# Patient Record
Sex: FEMALE | Race: WHITE | Employment: UNEMPLOYED | URBAN - METROPOLITAN AREA
[De-identification: names, ages, dates, MRNs, and addresses within clinical notes are randomized per-mention and may not be internally consistent; named-entity substitution may affect disease eponyms.]

---

## 2017-01-12 PROBLEM — Z36.89 SCREENING, ANTENATAL, FOR FETAL ANATOMIC SURVEY: Status: ACTIVE | Noted: 2017-01-12

## 2017-01-12 PROBLEM — O34.219 PREVIOUS CESAREAN DELIVERY AFFECTING PREGNANCY, ANTEPARTUM: Status: ACTIVE | Noted: 2017-01-12

## 2017-01-12 PROBLEM — Z3A.21 21 WEEKS GESTATION OF PREGNANCY: Status: ACTIVE | Noted: 2017-01-12

## 2018-04-26 ENCOUNTER — HOSPITAL ENCOUNTER (EMERGENCY)
Age: 28
Discharge: HOME | End: 2018-04-26
Attending: EMERGENCY MEDICINE
Payer: COMMERCIAL

## 2018-04-26 VITALS
HEIGHT: 61 IN | TEMPERATURE: 97.8 F | RESPIRATION RATE: 16 BRPM | SYSTOLIC BLOOD PRESSURE: 109 MMHG | DIASTOLIC BLOOD PRESSURE: 61 MMHG | BODY MASS INDEX: 20.01 KG/M2 | HEART RATE: 91 BPM | WEIGHT: 106 LBS | OXYGEN SATURATION: 100 %

## 2018-04-26 DIAGNOSIS — N89.8 VAGINAL DISCHARGE: Primary | ICD-10-CM

## 2018-04-26 LAB
BACTERIA: ABNORMAL /HPF
BILIRUBIN URINE: NEGATIVE
BLOOD, URINE: NEGATIVE
CLARITY: NORMAL
CLUE CELLS: NORMAL
COLOR: YELLOW
GLUCOSE URINE: NEGATIVE MG/DL
KETONES, URINE: NEGATIVE MG/DL
LEUKOCYTE ESTERASE, URINE: NEGATIVE
NITRITE, URINE: NEGATIVE
PH UA: 7 (ref 5–9)
PROTEIN UA: NEGATIVE MG/DL
RBC UA: ABNORMAL /HPF (ref 0–2)
SOURCE WET PREP: NORMAL
SPECIFIC GRAVITY UA: 1.02 (ref 1–1.03)
TRICHOMONAS PREP: NORMAL
UROBILINOGEN, URINE: 1 E.U./DL
WBC UA: ABNORMAL /HPF (ref 0–5)
YEAST WET PREP: NORMAL

## 2018-04-26 PROCEDURE — 87591 N.GONORRHOEAE DNA AMP PROB: CPT

## 2018-04-26 PROCEDURE — 81001 URINALYSIS AUTO W/SCOPE: CPT

## 2018-04-26 PROCEDURE — 87140 CULTURE TYPE IMMUNOFLUORESC: CPT

## 2018-04-26 PROCEDURE — 87255 GENET VIRUS ISOLATE HSV: CPT

## 2018-04-26 PROCEDURE — 87210 SMEAR WET MOUNT SALINE/INK: CPT

## 2018-04-26 PROCEDURE — 87088 URINE BACTERIA CULTURE: CPT

## 2018-04-26 PROCEDURE — 87077 CULTURE AEROBIC IDENTIFY: CPT

## 2018-04-26 PROCEDURE — 87186 SC STD MICRODIL/AGAR DIL: CPT

## 2018-04-26 PROCEDURE — 99283 EMERGENCY DEPT VISIT LOW MDM: CPT

## 2018-04-26 PROCEDURE — 87491 CHLMYD TRACH DNA AMP PROBE: CPT

## 2018-04-26 ASSESSMENT — PAIN DESCRIPTION - LOCATION: LOCATION: VAGINA

## 2018-04-26 ASSESSMENT — PAIN DESCRIPTION - DESCRIPTORS: DESCRIPTORS: ITCHING

## 2018-04-26 ASSESSMENT — PAIN SCALES - GENERAL: PAINLEVEL_OUTOF10: 9

## 2018-04-28 LAB
ORGANISM: ABNORMAL
URINE CULTURE, ROUTINE: ABNORMAL
URINE CULTURE, ROUTINE: ABNORMAL

## 2018-04-29 LAB — ORGANISM: ABNORMAL

## 2018-05-02 LAB
CHLAMYDIA TRACHOMATIS AMPLIFIED DET: NORMAL
N GONORRHOEAE AMPLIFIED DET: NORMAL

## 2019-01-27 ENCOUNTER — HOSPITAL ENCOUNTER (EMERGENCY)
Age: 29
Discharge: HOME | End: 2019-01-27
Attending: EMERGENCY MEDICINE
Payer: COMMERCIAL

## 2019-01-27 VITALS
HEIGHT: 61 IN | WEIGHT: 100 LBS | HEART RATE: 67 BPM | OXYGEN SATURATION: 100 % | TEMPERATURE: 97.7 F | RESPIRATION RATE: 16 BRPM | BODY MASS INDEX: 18.88 KG/M2 | SYSTOLIC BLOOD PRESSURE: 121 MMHG | DIASTOLIC BLOOD PRESSURE: 87 MMHG

## 2019-01-27 DIAGNOSIS — F19.10 DRUG ABUSE (HCC): Primary | ICD-10-CM

## 2019-01-27 DIAGNOSIS — G47.00 INSOMNIA, UNSPECIFIED TYPE: ICD-10-CM

## 2019-01-27 PROCEDURE — 99284 EMERGENCY DEPT VISIT MOD MDM: CPT

## 2019-01-27 RX ORDER — DIPHENHYDRAMINE HCL 25 MG
25 TABLET ORAL NIGHTLY PRN
Qty: 5 TABLET | Refills: 0 | Status: SHIPPED | OUTPATIENT
Start: 2019-01-27 | End: 2019-02-26

## 2021-03-23 ENCOUNTER — NURSE ONLY (OUTPATIENT)
Dept: PRIMARY CARE CLINIC | Age: 31
End: 2021-03-23
Payer: COMMERCIAL

## 2021-03-23 DIAGNOSIS — Z11.52 ENCOUNTER FOR SCREENING FOR COVID-19: Primary | ICD-10-CM

## 2021-03-23 LAB
Lab: NORMAL
PERFORMING INSTRUMENT: NORMAL
QC PASS/FAIL: NORMAL
SARS-COV-2, POC: NORMAL

## 2021-03-23 PROCEDURE — 87426 SARSCOV CORONAVIRUS AG IA: CPT | Performed by: NURSE PRACTITIONER

## 2021-03-28 ENCOUNTER — ANESTHESIA EVENT (OUTPATIENT)
Dept: LABOR AND DELIVERY | Age: 31
DRG: 540 | End: 2021-03-28
Payer: COMMERCIAL

## 2021-03-29 ENCOUNTER — ANESTHESIA (OUTPATIENT)
Dept: LABOR AND DELIVERY | Age: 31
DRG: 540 | End: 2021-03-29
Payer: COMMERCIAL

## 2021-03-29 ENCOUNTER — HOSPITAL ENCOUNTER (INPATIENT)
Age: 31
LOS: 2 days | Discharge: HOME | DRG: 540 | End: 2021-03-31
Attending: OBSTETRICS & GYNECOLOGY | Admitting: OBSTETRICS & GYNECOLOGY
Payer: COMMERCIAL

## 2021-03-29 VITALS — SYSTOLIC BLOOD PRESSURE: 124 MMHG | DIASTOLIC BLOOD PRESSURE: 58 MMHG | OXYGEN SATURATION: 91 %

## 2021-03-29 PROBLEM — Z3A.40 40 WEEKS GESTATION OF PREGNANCY: Status: ACTIVE | Noted: 2021-03-29

## 2021-03-29 LAB
ABO/RH: NORMAL
AMPHETAMINE SCREEN, URINE: NOT DETECTED
ANTIBODY SCREEN: NORMAL
BARBITURATE SCREEN URINE: NOT DETECTED
BENZODIAZEPINE SCREEN, URINE: NOT DETECTED
CANNABINOID SCREEN URINE: NOT DETECTED
COCAINE METABOLITE SCREEN URINE: POSITIVE
FENTANYL SCREEN, URINE: NOT DETECTED
HCT VFR BLD CALC: 34.7 % (ref 34–48)
HEMOGLOBIN: 11.7 G/DL (ref 11.5–15.5)
Lab: ABNORMAL
MCH RBC QN AUTO: 31.5 PG (ref 26–35)
MCHC RBC AUTO-ENTMCNC: 33.7 % (ref 32–34.5)
MCV RBC AUTO: 93.3 FL (ref 80–99.9)
METHADONE SCREEN, URINE: NOT DETECTED
OPIATE SCREEN URINE: NOT DETECTED
OXYCODONE URINE: NOT DETECTED
PDW BLD-RTO: 14.3 FL (ref 11.5–15)
PHENCYCLIDINE SCREEN URINE: NOT DETECTED
PLATELET # BLD: 246 E9/L (ref 130–450)
PMV BLD AUTO: 10.4 FL (ref 7–12)
RBC # BLD: 3.72 E12/L (ref 3.5–5.5)
WBC # BLD: 8 E9/L (ref 4.5–11.5)

## 2021-03-29 PROCEDURE — 6370000000 HC RX 637 (ALT 250 FOR IP): Performed by: OBSTETRICS & GYNECOLOGY

## 2021-03-29 PROCEDURE — 3609079900 HC CESAREAN SECTION: Performed by: OBSTETRICS & GYNECOLOGY

## 2021-03-29 PROCEDURE — 6370000000 HC RX 637 (ALT 250 FOR IP): Performed by: ANESTHESIOLOGY

## 2021-03-29 PROCEDURE — 88307 TISSUE EXAM BY PATHOLOGIST: CPT

## 2021-03-29 PROCEDURE — 85027 COMPLETE CBC AUTOMATED: CPT

## 2021-03-29 PROCEDURE — 59514 CESAREAN DELIVERY ONLY: CPT | Performed by: OBSTETRICS & GYNECOLOGY

## 2021-03-29 PROCEDURE — 1220000000 HC SEMI PRIVATE OB R&B

## 2021-03-29 PROCEDURE — 2580000003 HC RX 258: Performed by: OBSTETRICS & GYNECOLOGY

## 2021-03-29 PROCEDURE — 2709999900 HC NON-CHARGEABLE SUPPLY: Performed by: OBSTETRICS & GYNECOLOGY

## 2021-03-29 PROCEDURE — 6360000002 HC RX W HCPCS: Performed by: OBSTETRICS & GYNECOLOGY

## 2021-03-29 PROCEDURE — 80307 DRUG TEST PRSMV CHEM ANLYZR: CPT

## 2021-03-29 PROCEDURE — 7100000000 HC PACU RECOVERY - FIRST 15 MIN: Performed by: OBSTETRICS & GYNECOLOGY

## 2021-03-29 PROCEDURE — 86900 BLOOD TYPING SEROLOGIC ABO: CPT

## 2021-03-29 PROCEDURE — G0480 DRUG TEST DEF 1-7 CLASSES: HCPCS

## 2021-03-29 PROCEDURE — 3700000001 HC ADD 15 MINUTES (ANESTHESIA): Performed by: OBSTETRICS & GYNECOLOGY

## 2021-03-29 PROCEDURE — 86850 RBC ANTIBODY SCREEN: CPT

## 2021-03-29 PROCEDURE — 2780000010 HC IMPLANT OTHER: Performed by: OBSTETRICS & GYNECOLOGY

## 2021-03-29 PROCEDURE — 2500000003 HC RX 250 WO HCPCS

## 2021-03-29 PROCEDURE — 6360000002 HC RX W HCPCS

## 2021-03-29 PROCEDURE — 6360000002 HC RX W HCPCS: Performed by: ANESTHESIOLOGY

## 2021-03-29 PROCEDURE — 86901 BLOOD TYPING SEROLOGIC RH(D): CPT

## 2021-03-29 PROCEDURE — 7100000001 HC PACU RECOVERY - ADDTL 15 MIN: Performed by: OBSTETRICS & GYNECOLOGY

## 2021-03-29 PROCEDURE — 36415 COLL VENOUS BLD VENIPUNCTURE: CPT

## 2021-03-29 PROCEDURE — 3700000000 HC ANESTHESIA ATTENDED CARE: Performed by: OBSTETRICS & GYNECOLOGY

## 2021-03-29 RX ORDER — MODIFIED LANOLIN
OINTMENT (GRAM) TOPICAL
Status: DISCONTINUED | OUTPATIENT
Start: 2021-03-29 | End: 2021-03-31 | Stop reason: HOSPADM

## 2021-03-29 RX ORDER — PHENYLEPHRINE HYDROCHLORIDE 10 MG/ML
INJECTION INTRAVENOUS PRN
Status: DISCONTINUED | OUTPATIENT
Start: 2021-03-29 | End: 2021-03-29 | Stop reason: SDUPTHER

## 2021-03-29 RX ORDER — KETOROLAC TROMETHAMINE 30 MG/ML
INJECTION, SOLUTION INTRAMUSCULAR; INTRAVENOUS
Status: COMPLETED
Start: 2021-03-29 | End: 2021-03-29

## 2021-03-29 RX ORDER — OXYCODONE HYDROCHLORIDE AND ACETAMINOPHEN 5; 325 MG/1; MG/1
1 TABLET ORAL EVERY 6 HOURS PRN
Status: DISPENSED | OUTPATIENT
Start: 2021-03-29 | End: 2021-03-30

## 2021-03-29 RX ORDER — KETOROLAC TROMETHAMINE 30 MG/ML
30 INJECTION, SOLUTION INTRAMUSCULAR; INTRAVENOUS EVERY 6 HOURS
Status: ACTIVE | OUTPATIENT
Start: 2021-03-30 | End: 2021-03-31

## 2021-03-29 RX ORDER — BUPIVACAINE HYDROCHLORIDE 7.5 MG/ML
INJECTION, SOLUTION INTRASPINAL PRN
Status: DISCONTINUED | OUTPATIENT
Start: 2021-03-29 | End: 2021-03-29 | Stop reason: SDUPTHER

## 2021-03-29 RX ORDER — TRISODIUM CITRATE DIHYDRATE AND CITRIC ACID MONOHYDRATE 500; 334 MG/5ML; MG/5ML
30 SOLUTION ORAL ONCE
Status: COMPLETED | OUTPATIENT
Start: 2021-03-29 | End: 2021-03-29

## 2021-03-29 RX ORDER — BUPRENORPHINE HYDROCHLORIDE 8 MG/1
8 TABLET SUBLINGUAL 2 TIMES DAILY
Status: DISCONTINUED | OUTPATIENT
Start: 2021-03-29 | End: 2021-03-29 | Stop reason: SDUPTHER

## 2021-03-29 RX ORDER — KETOROLAC TROMETHAMINE 30 MG/ML
15 INJECTION, SOLUTION INTRAMUSCULAR; INTRAVENOUS EVERY 6 HOURS PRN
Status: DISPENSED | OUTPATIENT
Start: 2021-03-29 | End: 2021-03-30

## 2021-03-29 RX ORDER — CARBOPROST TROMETHAMINE 250 UG/ML
250 INJECTION, SOLUTION INTRAMUSCULAR PRN
Status: DISCONTINUED | OUTPATIENT
Start: 2021-03-29 | End: 2021-03-31 | Stop reason: HOSPADM

## 2021-03-29 RX ORDER — MISOPROSTOL 200 UG/1
800 TABLET ORAL PRN
Status: DISCONTINUED | OUTPATIENT
Start: 2021-03-29 | End: 2021-03-31 | Stop reason: HOSPADM

## 2021-03-29 RX ORDER — NALOXONE HYDROCHLORIDE 0.4 MG/ML
0.4 INJECTION, SOLUTION INTRAMUSCULAR; INTRAVENOUS; SUBCUTANEOUS PRN
Status: DISCONTINUED | OUTPATIENT
Start: 2021-03-29 | End: 2021-03-31 | Stop reason: HOSPADM

## 2021-03-29 RX ORDER — ONDANSETRON 2 MG/ML
INJECTION INTRAMUSCULAR; INTRAVENOUS PRN
Status: DISCONTINUED | OUTPATIENT
Start: 2021-03-29 | End: 2021-03-29 | Stop reason: SDUPTHER

## 2021-03-29 RX ORDER — DOCUSATE SODIUM 100 MG/1
100 CAPSULE, LIQUID FILLED ORAL 2 TIMES DAILY
Status: DISCONTINUED | OUTPATIENT
Start: 2021-03-29 | End: 2021-03-31 | Stop reason: HOSPADM

## 2021-03-29 RX ORDER — DIPHENHYDRAMINE HYDROCHLORIDE 50 MG/ML
12.5 INJECTION INTRAMUSCULAR; INTRAVENOUS EVERY 6 HOURS PRN
Status: ACTIVE | OUTPATIENT
Start: 2021-03-29 | End: 2021-03-30

## 2021-03-29 RX ORDER — SODIUM CHLORIDE, SODIUM LACTATE, POTASSIUM CHLORIDE, CALCIUM CHLORIDE 600; 310; 30; 20 MG/100ML; MG/100ML; MG/100ML; MG/100ML
INJECTION, SOLUTION INTRAVENOUS CONTINUOUS
Status: DISCONTINUED | OUTPATIENT
Start: 2021-03-29 | End: 2021-03-31 | Stop reason: HOSPADM

## 2021-03-29 RX ORDER — ACETAMINOPHEN 325 MG/1
650 TABLET ORAL EVERY 4 HOURS PRN
Status: DISCONTINUED | OUTPATIENT
Start: 2021-03-30 | End: 2021-03-31 | Stop reason: HOSPADM

## 2021-03-29 RX ORDER — IBUPROFEN 800 MG/1
800 TABLET ORAL EVERY 8 HOURS
Status: DISCONTINUED | OUTPATIENT
Start: 2021-03-30 | End: 2021-03-31 | Stop reason: HOSPADM

## 2021-03-29 RX ORDER — METHYLERGONOVINE MALEATE 0.2 MG/ML
200 INJECTION INTRAVENOUS PRN
Status: DISCONTINUED | OUTPATIENT
Start: 2021-03-29 | End: 2021-03-31 | Stop reason: HOSPADM

## 2021-03-29 RX ORDER — SODIUM CHLORIDE, SODIUM LACTATE, POTASSIUM CHLORIDE, AND CALCIUM CHLORIDE .6; .31; .03; .02 G/100ML; G/100ML; G/100ML; G/100ML
1000 INJECTION, SOLUTION INTRAVENOUS ONCE
Status: COMPLETED | OUTPATIENT
Start: 2021-03-29 | End: 2021-03-29

## 2021-03-29 RX ORDER — KETAMINE HYDROCHLORIDE 10 MG/ML
INJECTION, SOLUTION INTRAMUSCULAR; INTRAVENOUS PRN
Status: DISCONTINUED | OUTPATIENT
Start: 2021-03-29 | End: 2021-03-29 | Stop reason: SDUPTHER

## 2021-03-29 RX ORDER — PRENATAL WITH FERROUS FUM AND FOLIC ACID 3080; 920; 120; 400; 22; 1.84; 3; 20; 10; 1; 12; 200; 27; 25; 2 [IU]/1; [IU]/1; MG/1; [IU]/1; MG/1; MG/1; MG/1; MG/1; MG/1; MG/1; UG/1; MG/1; MG/1; MG/1; MG/1
1 TABLET ORAL DAILY
Status: DISCONTINUED | OUTPATIENT
Start: 2021-03-29 | End: 2021-03-31 | Stop reason: HOSPADM

## 2021-03-29 RX ORDER — BUPRENORPHINE HYDROCHLORIDE 8 MG/1
8 TABLET SUBLINGUAL 2 TIMES DAILY
Status: DISCONTINUED | OUTPATIENT
Start: 2021-03-29 | End: 2021-03-30

## 2021-03-29 RX ORDER — ONDANSETRON 2 MG/ML
4 INJECTION INTRAMUSCULAR; INTRAVENOUS EVERY 6 HOURS PRN
Status: ACTIVE | OUTPATIENT
Start: 2021-03-29 | End: 2021-03-30

## 2021-03-29 RX ORDER — SODIUM CHLORIDE 0.9 % (FLUSH) 0.9 %
SYRINGE (ML) INJECTION
Status: DISPENSED
Start: 2021-03-29 | End: 2021-03-30

## 2021-03-29 RX ORDER — FENTANYL CITRATE 50 UG/ML
25 INJECTION, SOLUTION INTRAMUSCULAR; INTRAVENOUS EVERY 5 MIN PRN
Status: DISCONTINUED | OUTPATIENT
Start: 2021-03-29 | End: 2021-03-29 | Stop reason: HOSPADM

## 2021-03-29 RX ORDER — FERROUS SULFATE 325(65) MG
325 TABLET ORAL 2 TIMES DAILY WITH MEALS
Status: DISCONTINUED | OUTPATIENT
Start: 2021-03-29 | End: 2021-03-31 | Stop reason: HOSPADM

## 2021-03-29 RX ORDER — SODIUM CHLORIDE 0.9 % (FLUSH) 0.9 %
10 SYRINGE (ML) INJECTION EVERY 12 HOURS SCHEDULED
Status: DISCONTINUED | OUTPATIENT
Start: 2021-03-29 | End: 2021-03-31 | Stop reason: HOSPADM

## 2021-03-29 RX ORDER — MORPHINE SULFATE 1 MG/ML
INJECTION, SOLUTION EPIDURAL; INTRATHECAL; INTRAVENOUS PRN
Status: DISCONTINUED | OUTPATIENT
Start: 2021-03-29 | End: 2021-03-29 | Stop reason: SDUPTHER

## 2021-03-29 RX ORDER — DIPHENHYDRAMINE HYDROCHLORIDE 50 MG/ML
25 INJECTION INTRAMUSCULAR; INTRAVENOUS EVERY 6 HOURS PRN
Status: DISCONTINUED | OUTPATIENT
Start: 2021-03-30 | End: 2021-03-31 | Stop reason: HOSPADM

## 2021-03-29 RX ORDER — LIDOCAINE HYDROCHLORIDE 10 MG/ML
INJECTION, SOLUTION INFILTRATION; PERINEURAL PRN
Status: DISCONTINUED | OUTPATIENT
Start: 2021-03-29 | End: 2021-03-29 | Stop reason: SDUPTHER

## 2021-03-29 RX ORDER — METHYLERGONOVINE MALEATE 0.2 MG/ML
200 INJECTION INTRAVENOUS ONCE
Status: COMPLETED | OUTPATIENT
Start: 2021-03-29 | End: 2021-03-29

## 2021-03-29 RX ORDER — OXYCODONE HYDROCHLORIDE AND ACETAMINOPHEN 5; 325 MG/1; MG/1
2 TABLET ORAL EVERY 6 HOURS PRN
Status: DISPENSED | OUTPATIENT
Start: 2021-03-29 | End: 2021-03-30

## 2021-03-29 RX ORDER — MIDAZOLAM HYDROCHLORIDE 1 MG/ML
INJECTION INTRAMUSCULAR; INTRAVENOUS PRN
Status: DISCONTINUED | OUTPATIENT
Start: 2021-03-29 | End: 2021-03-29 | Stop reason: SDUPTHER

## 2021-03-29 RX ORDER — FENTANYL CITRATE 50 UG/ML
INJECTION, SOLUTION INTRAMUSCULAR; INTRAVENOUS PRN
Status: DISCONTINUED | OUTPATIENT
Start: 2021-03-29 | End: 2021-03-29 | Stop reason: SDUPTHER

## 2021-03-29 RX ADMIN — PHENYLEPHRINE HYDROCHLORIDE 100 MCG: 10 INJECTION INTRAVENOUS at 10:26

## 2021-03-29 RX ADMIN — SODIUM CHLORIDE, POTASSIUM CHLORIDE, SODIUM LACTATE AND CALCIUM CHLORIDE: 600; 310; 30; 20 INJECTION, SOLUTION INTRAVENOUS at 10:01

## 2021-03-29 RX ADMIN — KETOROLAC TROMETHAMINE 15 MG: 30 INJECTION, SOLUTION INTRAMUSCULAR; INTRAVENOUS at 11:28

## 2021-03-29 RX ADMIN — SODIUM CHLORIDE, POTASSIUM CHLORIDE, SODIUM LACTATE AND CALCIUM CHLORIDE: 600; 310; 30; 20 INJECTION, SOLUTION INTRAVENOUS at 08:55

## 2021-03-29 RX ADMIN — OXYCODONE HYDROCHLORIDE AND ACETAMINOPHEN 2 TABLET: 5; 325 TABLET ORAL at 21:13

## 2021-03-29 RX ADMIN — SODIUM CITRATE AND CITRIC ACID MONOHYDRATE 30 ML: 500; 334 SOLUTION ORAL at 09:58

## 2021-03-29 RX ADMIN — PHENYLEPHRINE HYDROCHLORIDE 100 MCG: 10 INJECTION INTRAVENOUS at 10:36

## 2021-03-29 RX ADMIN — BUPIVACAINE HYDROCHLORIDE IN DEXTROSE 1.6 ML: 7.5 INJECTION, SOLUTION SUBARACHNOID at 10:19

## 2021-03-29 RX ADMIN — FENTANYL CITRATE 50 MCG: 50 INJECTION, SOLUTION INTRAMUSCULAR; INTRAVENOUS at 10:40

## 2021-03-29 RX ADMIN — Medication 999 ML/HR: at 10:38

## 2021-03-29 RX ADMIN — SODIUM CHLORIDE, POTASSIUM CHLORIDE, SODIUM LACTATE AND CALCIUM CHLORIDE 1000 ML: 600; 310; 30; 20 INJECTION, SOLUTION INTRAVENOUS at 07:55

## 2021-03-29 RX ADMIN — FENTANYL CITRATE 50 MCG: 50 INJECTION, SOLUTION INTRAMUSCULAR; INTRAVENOUS at 11:05

## 2021-03-29 RX ADMIN — ONDANSETRON 4 MG: 2 INJECTION INTRAMUSCULAR; INTRAVENOUS at 10:38

## 2021-03-29 RX ADMIN — SODIUM CHLORIDE, POTASSIUM CHLORIDE, SODIUM LACTATE AND CALCIUM CHLORIDE: 600; 310; 30; 20 INJECTION, SOLUTION INTRAVENOUS at 10:41

## 2021-03-29 RX ADMIN — METHYLERGONOVINE MALEATE 200 MCG: 0.2 INJECTION INTRAVENOUS at 11:13

## 2021-03-29 RX ADMIN — MIDAZOLAM 2 MG: 1 INJECTION INTRAMUSCULAR; INTRAVENOUS at 10:45

## 2021-03-29 RX ADMIN — DOCUSATE SODIUM 100 MG: 100 CAPSULE, LIQUID FILLED ORAL at 20:23

## 2021-03-29 RX ADMIN — LIDOCAINE HYDROCHLORIDE 2 ML: 10 INJECTION, SOLUTION INFILTRATION; PERINEURAL at 10:09

## 2021-03-29 RX ADMIN — KETOROLAC TROMETHAMINE 15 MG: 30 INJECTION, SOLUTION INTRAMUSCULAR; INTRAVENOUS at 18:46

## 2021-03-29 RX ADMIN — SODIUM CHLORIDE, PRESERVATIVE FREE 10 ML: 5 INJECTION INTRAVENOUS at 20:24

## 2021-03-29 RX ADMIN — Medication 2000 MG: at 09:58

## 2021-03-29 RX ADMIN — LIDOCAINE HYDROCHLORIDE 2 ML: 10 INJECTION, SOLUTION INFILTRATION; PERINEURAL at 10:16

## 2021-03-29 RX ADMIN — MORPHINE SULFATE 0.15 MG: 1 INJECTION, SOLUTION EPIDURAL; INTRATHECAL; INTRAVENOUS at 10:19

## 2021-03-29 RX ADMIN — KETAMINE HYDROCHLORIDE 20 MG: 10 INJECTION INTRAMUSCULAR; INTRAVENOUS at 10:45

## 2021-03-29 RX ADMIN — BUPRENORPHINE HCL 8 MG: 8 TABLET SUBLINGUAL at 18:45

## 2021-03-29 RX ADMIN — PHENYLEPHRINE HYDROCHLORIDE 100 MCG: 10 INJECTION INTRAVENOUS at 10:31

## 2021-03-29 RX ADMIN — OXYCODONE HYDROCHLORIDE AND ACETAMINOPHEN 1 TABLET: 5; 325 TABLET ORAL at 14:45

## 2021-03-29 ASSESSMENT — PULMONARY FUNCTION TESTS
PIF_VALUE: 0
PIF_VALUE: 1
PIF_VALUE: 0
PIF_VALUE: 1
PIF_VALUE: 0
PIF_VALUE: 1
PIF_VALUE: 1
PIF_VALUE: 0
PIF_VALUE: 0
PIF_VALUE: 1
PIF_VALUE: 0
PIF_VALUE: 1
PIF_VALUE: 1
PIF_VALUE: 0
PIF_VALUE: 1
PIF_VALUE: 1
PIF_VALUE: 0
PIF_VALUE: 0
PIF_VALUE: 1
PIF_VALUE: 0
PIF_VALUE: 0
PIF_VALUE: 1
PIF_VALUE: 1
PIF_VALUE: 0
PIF_VALUE: 1
PIF_VALUE: 0
PIF_VALUE: 1

## 2021-03-29 ASSESSMENT — PAIN DESCRIPTION - PROGRESSION
CLINICAL_PROGRESSION: GRADUALLY IMPROVING
CLINICAL_PROGRESSION: GRADUALLY WORSENING

## 2021-03-29 ASSESSMENT — PAIN SCALES - GENERAL
PAINLEVEL_OUTOF10: 10
PAINLEVEL_OUTOF10: 8
PAINLEVEL_OUTOF10: 10
PAINLEVEL_OUTOF10: 0
PAINLEVEL_OUTOF10: 8
PAINLEVEL_OUTOF10: 4

## 2021-03-29 ASSESSMENT — PAIN DESCRIPTION - ONSET: ONSET: GRADUAL

## 2021-03-29 ASSESSMENT — PAIN DESCRIPTION - DESCRIPTORS
DESCRIPTORS: ACHING;CRAMPING
DESCRIPTORS: CRAMPING;ACHING

## 2021-03-29 ASSESSMENT — PAIN DESCRIPTION - PAIN TYPE
TYPE: SURGICAL PAIN
TYPE: SURGICAL PAIN

## 2021-03-29 ASSESSMENT — PAIN - FUNCTIONAL ASSESSMENT: PAIN_FUNCTIONAL_ASSESSMENT: ACTIVITIES ARE NOT PREVENTED

## 2021-03-29 NOTE — FLOWSHEET NOTE
Patient transported to mom baby unit room #320 via cart accompanied by father of infant, \"Alberto\".

## 2021-03-29 NOTE — H&P
Department of Obstetrics and Gynecology  Attending Obstetrics History and Physical      CHIEF COMPLAINT: For repeat     HISTORY OF PRESENT ILLNESS:      The patient is a 31 y.o. Denisecharlie Dunnman at 40 weeks 1 days  Patient presents with a chief complaint as above and is being admitted for   without tubal ligation    DATES:    Patient's last menstrual period was 2020. Estimated Date of Delivery: 3/28/21    PRENATAL CARE:    Provider: KANIKA Blackwood,  FACOG    Blood Type/Rh: B+  Hepatitis B Surface Antigen: Negative  Gonorrhea: Negative  Chlamydia: Negative  Group B Strep: Negative      PAST OB HISTORY        Depression:  Yes       Post-partum depression:  No      Diabetes:  No      Gestational Diabetes:  No      Thyroid Disease:  No      Chronic HTN:  No      Gestation HTN:  No      Pre-eclampsia:  No      Seizure disorder:  No      Asthma:  No      Clotting disorder:  No      :  Yes x2      Tubal ligation:  No      D & C:  No      Cerclage:  No      LEEP:  No      Myomectomy:  No    OB History    Para Term  AB Living   3 2 2     2   SAB TAB Ectopic Molar Multiple Live Births             2      # Outcome Date GA Lbr Julian/2nd Weight Sex Delivery Anes PTL Lv   3 Current            2 Term 17   8 lb (3.629 kg) M CS-LTranv Spinal N JLUIS   1 Term 14 40w0d  7 lb (3.175 kg) M CS-LTranv  N JLUIS           Past Medical History:    History reviewed. No pertinent past medical history. Past Surgical History:        Procedure Laterality Date     SECTION      INDUCED       MOUTH SURGERY       Social History:    DRUGS:  Current drug usage. Type of drug:  Cocaine and opioids. Frequency of use:  Weekly. Approximate date of last drug use: Several days ago. Duration of drug use:  3 years  Family History:   History reviewed. No pertinent family history.   Medications Prior to Admission:  Medications Prior to Admission: Buprenorphine HCl-Naloxone HCl (SUBOXONE

## 2021-03-29 NOTE — ANESTHESIA PRE PROCEDURE
Department of Anesthesiology  Preprocedure Note       Name:  Bar Huff   Age:  31 y.o.  :  1990                                          MRN:  [id number]         Date:  3/29/2021      Surgeon: Oriana العلي):  Anya Jc MD    Procedure: Procedure(s):   SECTION    Medications prior to admission:   Prior to Admission medications    Medication Sig Start Date End Date Taking? Authorizing Provider   Buprenorphine HCl-Naloxone HCl (SUBOXONE SL) Place under the tongue    Historical Provider, MD   Prenatal Vit-Fe Fumarate-FA (PRENATAL PLUS) 27-1 MG TABS Take 1 tablet by mouth daily 20   DELANEY Mcclain - CNP       Current medications:    Current Facility-Administered Medications   Medication Dose Route Frequency Provider Last Rate Last Admin    lactated ringers infusion   Intravenous Continuous Anya Jc MD        lactated ringers bolus  1,000 mL Intravenous Once Anya Jc MD        citric acid-sodium citrate (BICITRA) solution 30 mL  30 mL Oral Once Anya Jc MD        ceFAZolin (ANCEF) 2000 mg in sterile water 20 mL IV syringe  2 g Intravenous Once Anya Jc MD        oxytocin (PITOCIN) 10 unit bolus from the bag  10 Units Intravenous PRN Anya Jc MD        And    oxytocin (PITOCIN) 30 units in 500 mL infusion  87.3 samantha-units/min Intravenous PRN Anya Jc MD           Allergies:  No Known Allergies    Problem List:    Patient Active Problem List   Diagnosis Code    21 weeks gestation of pregnancy Z3A.21    Screening, , for fetal anatomic survey Z36.89    Previous  delivery affecting pregnancy, antepartum O34.219    40 weeks gestation of pregnancy Z3A.40       Past Medical History:  No past medical history on file.     Past Surgical History:        Procedure Laterality Date     SECTION      INDUCED       MOUTH SURGERY         Social History:    Social History     Tobacco Use    Smoking status: Current Every Day 03/23/2021           Anesthesia Evaluation  Patient summary reviewed and Nursing notes reviewed no history of anesthetic complications:   Airway: Mallampati: III  TM distance: >3 FB   Neck ROM: full  Mouth opening: > = 3 FB Dental:          Pulmonary:Negative Pulmonary ROS breath sounds clear to auscultation                             Cardiovascular:Negative CV ROS            Rhythm: regular  Rate: normal                    Neuro/Psych:   Negative Neuro/Psych ROS              GI/Hepatic/Renal: Neg GI/Hepatic/Renal ROS            Endo/Other:                      ROS comment: Heroin use in the past--has been on Suboxone recently. Hx of cocaine in tox screen today. Abdominal:       Abdomen: soft. Vascular: negative vascular ROS. Anesthesia Plan      spinal and general     ASA 2     (R HA PIV 20. Pt understands risks and benefits to spinal and general anesthesia. Pt accepts general anesthesia as backup plan only. )  Induction: rapid sequence. Anesthetic plan and risks discussed with patient. Use of blood products discussed with patient whom consented to blood products. Plan discussed with CRNA and attending.                   Anayeli Lewis RN   3/29/2021

## 2021-03-29 NOTE — ANESTHESIA PROCEDURE NOTES
Spinal Block    Patient location during procedure: OB  Start time: 3/29/2021 10:10 AM  End time: 3/29/2021 10:19 AM  Reason for block: procedure for pain, post-op pain management, primary anesthetic and at surgeon's request  Staffing  Performed: anesthesiologist and other anesthesia staff   Anesthesiologist: Ifeoma Howell MD  Resident/CRNA: Heladio Clemens APRN - CRNA  Other anesthesia staff: Flaco Vázquez RN  Preanesthetic Checklist  Completed: patient identified, IV checked, risks and benefits discussed, surgical consent, monitors and equipment checked, pre-op evaluation, timeout performed, anesthesia consent given, oxygen available and patient being monitored  Spinal Block  Patient position: sitting  Prep: ChloraPrep  Patient monitoring: cardiac monitor, continuous pulse ox and frequent blood pressure checks  Approach: midline  Location: L3/L4  Provider prep: mask and sterile gloves  Local infiltration: lidocaine  Dose: 0.2  Agent: bupivacaine  Adjuvant: duramorph  Dose: 1.6  Dose: 1.6  Needle  Needle type: Pencan   Needle gauge: 25 G  Needle length: 3.5 in  Needle insertion depth: 6 cm  Assessment  Events: cerebrospinal fluid  Swirl obtained: Yes  CSF: clear  Attempts: 3+  Hemodynamics: stable

## 2021-03-29 NOTE — FLOWSHEET NOTE
Patient admitted to room 320 from labor and delivery. Oriented to room and surroundings. Reviewed papers at bedside. [facility] Infant safety instructions and sleep safe for baby given, patient verbalizes understanding. Informed of visitation policy and that one person 25 or older may stay. Instructed on IS. IV infusing without difficulty.  Patient states ok for infant to have the hep B vaccine, refuses flu and tdap vaccines

## 2021-03-29 NOTE — PROGRESS NOTES
presents to unit for repeat LTCS. Pt is 40weeks and 1 day. Denies lof,vaginal bleeding, perceives fetal movement and feeling occassional contractions. Pt placed on efm and call light within reach.

## 2021-03-29 NOTE — OP NOTE
Operative Note      Patient: Freda Kocher  YOB: 1990  MRN: [id number]    Date of Procedure: 3/29/2021    Pre-Op Diagnosis: csection    Post-Op Diagnosis: Same       Procedure(s):   SECTION    Surgeon(s):  MD Bina Ferrara MD    Assistant: Bina Koenig    Anesthesia: Spinal    Estimated Blood Loss (mL): 300    Complications: None    Specimens:   * No specimens in log *    Implants:  * No implants in log *      Drains:   Urethral Catheter Non-latex 16 fr (Active)       First Assistant: Bina Koenig MD    The use of a first assistant surgeon was necessary because there was no qualified resident surgeon available. Bina Koenig MD  assisted in the proper positioning, prepping, and draping of the patient, intraoperative retraction and suctioning for visualization, passing sutures and suture management.   Electronically signed by Bina Koenig MD on 3/29/2021 at 1:49 PM

## 2021-03-29 NOTE — PROGRESS NOTES
Repeate LTCS of viable baby girl at 46. Baby pink and warm, delayed cord clamping complete. Baby brought to warmer for further assessment. APGARS 9/9.

## 2021-03-29 NOTE — PROCEDURES
Department of Obstetrics and Gynecology   Section Note    Indications: previous uterine incision carrillo x2    Pre-operative Diagnosis: 40 week 1 day pregnancy. Post-operative Diagnosis: Living  infant(s) and Female    Surgeon: Lashell Bright     Assistants: LEIF Wu. Anesthesia: Spinal anesthesia    ASA Class: 2    Procedure Details   The patient was seen in the Holding Room. The risks, benefits, complications, treatment options, and expected outcomes were discussed with the patient. The patient concurred with the proposed plan, giving informed consent. The site of surgery properly noted/marked. The patient was taken to Operating Room # 2, identified as Jan Murrieta and the procedure verified as  Delivery. A Time Out was held and the above information confirmed. After induction of anesthesia, the patient was draped and prepped in the usual sterile manner. A Pfannenstiel incision was made and carried down through the subcutaneous tissue to the fascia. Fascial incision was made and extended transversely. The fascia was  from the underlying rectus tissue superiorly and inferiorly. The peritoneum was identified and entered. Peritoneal incision was extended longitudinally. The utero-vesical peritoneal reflection was incised transversely and the bladder flap was bluntly freed from the lower uterine segment. A low transverse uterine incision was made. Delivered from ROT presentation was a 3220 gram female with Apgar scores of 9 at one minute and 9 at five minutes. After the umbilical cord was clamped and cut cord blood was obtained for evaluation. The placenta was removed intact and appeared normal. The uterine outline, tubes and ovaries appeared normal. The uterine incision was closed with running locked sutures of 0 Vicryl. Hemostasis was observed. Lavage was carried out until clear. Angélica and Interceed were placed over the uterine incision.   The fascia was then reapproximated with running sutures of 1.0 Vicryl. The skin was reapproximated with absorbable skin staples and Dermabond. Instrument, sponge, and needle counts were correct prior the abdominal closure and at the conclusion of the case. Findings:  The placenta ovaries fallopian tubes were normal    Estimated Blood Loss:  500ml           Drains: Garcia           Total IV Fluids: 2200 ml           Specimens: The placenta           Implants: None           Complications: None            Disposition: PACU - hemodynamically stable. Condition: infant stable to general nursery    Attending Attestation: I performed the procedure.

## 2021-03-30 LAB
HCT VFR BLD CALC: 33.4 % (ref 34–48)
HEMOGLOBIN: 11.1 G/DL (ref 11.5–15.5)
MCH RBC QN AUTO: 31.5 PG (ref 26–35)
MCHC RBC AUTO-ENTMCNC: 33.2 % (ref 32–34.5)
MCV RBC AUTO: 94.9 FL (ref 80–99.9)
PDW BLD-RTO: 14.5 FL (ref 11.5–15)
PLATELET # BLD: 246 E9/L (ref 130–450)
PMV BLD AUTO: 11.3 FL (ref 7–12)
RBC # BLD: 3.52 E12/L (ref 3.5–5.5)
WBC # BLD: 10.7 E9/L (ref 4.5–11.5)

## 2021-03-30 PROCEDURE — 6360000002 HC RX W HCPCS: Performed by: ANESTHESIOLOGY

## 2021-03-30 PROCEDURE — 6360000002 HC RX W HCPCS: Performed by: OBSTETRICS & GYNECOLOGY

## 2021-03-30 PROCEDURE — 36415 COLL VENOUS BLD VENIPUNCTURE: CPT

## 2021-03-30 PROCEDURE — 85027 COMPLETE CBC AUTOMATED: CPT

## 2021-03-30 PROCEDURE — 6370000000 HC RX 637 (ALT 250 FOR IP): Performed by: OBSTETRICS & GYNECOLOGY

## 2021-03-30 PROCEDURE — 6370000000 HC RX 637 (ALT 250 FOR IP): Performed by: ANESTHESIOLOGY

## 2021-03-30 PROCEDURE — 2580000003 HC RX 258: Performed by: OBSTETRICS & GYNECOLOGY

## 2021-03-30 PROCEDURE — 1220000000 HC SEMI PRIVATE OB R&B

## 2021-03-30 RX ORDER — NICOTINE 21 MG/24HR
1 PATCH, TRANSDERMAL 24 HOURS TRANSDERMAL DAILY
Status: DISCONTINUED | OUTPATIENT
Start: 2021-03-30 | End: 2021-03-31 | Stop reason: HOSPADM

## 2021-03-30 RX ORDER — BUPRENORPHINE HYDROCHLORIDE 8 MG/1
8 TABLET SUBLINGUAL ONCE
Status: COMPLETED | OUTPATIENT
Start: 2021-03-30 | End: 2021-03-30

## 2021-03-30 RX ORDER — BUPRENORPHINE HYDROCHLORIDE 8 MG/1
16 TABLET SUBLINGUAL DAILY
Status: DISCONTINUED | OUTPATIENT
Start: 2021-03-31 | End: 2021-03-31 | Stop reason: HOSPADM

## 2021-03-30 RX ADMIN — METFORMIN HYDROCHLORIDE 1 TABLET: 500 TABLET, EXTENDED RELEASE ORAL at 09:34

## 2021-03-30 RX ADMIN — ACETAMINOPHEN 650 MG: 325 TABLET ORAL at 15:20

## 2021-03-30 RX ADMIN — ACETAMINOPHEN 650 MG: 325 TABLET ORAL at 20:41

## 2021-03-30 RX ADMIN — KETOROLAC TROMETHAMINE 15 MG: 30 INJECTION, SOLUTION INTRAMUSCULAR; INTRAVENOUS at 09:32

## 2021-03-30 RX ADMIN — BUPRENORPHINE HCL 8 MG: 8 TABLET SUBLINGUAL at 06:59

## 2021-03-30 RX ADMIN — OXYCODONE HYDROCHLORIDE AND ACETAMINOPHEN 2 TABLET: 5; 325 TABLET ORAL at 03:24

## 2021-03-30 RX ADMIN — BUPRENORPHINE HCL 8 MG: 8 TABLET SUBLINGUAL at 14:01

## 2021-03-30 RX ADMIN — SODIUM CHLORIDE, PRESERVATIVE FREE 10 ML: 5 INJECTION INTRAVENOUS at 09:33

## 2021-03-30 RX ADMIN — KETOROLAC TROMETHAMINE 15 MG: 30 INJECTION, SOLUTION INTRAMUSCULAR; INTRAVENOUS at 02:29

## 2021-03-30 RX ADMIN — IBUPROFEN 800 MG: 800 TABLET, FILM COATED ORAL at 17:40

## 2021-03-30 RX ADMIN — DOCUSATE SODIUM 100 MG: 100 CAPSULE, LIQUID FILLED ORAL at 09:34

## 2021-03-30 RX ADMIN — DOCUSATE SODIUM 100 MG: 100 CAPSULE, LIQUID FILLED ORAL at 20:41

## 2021-03-30 RX ADMIN — OXYCODONE HYDROCHLORIDE AND ACETAMINOPHEN 2 TABLET: 5; 325 TABLET ORAL at 10:13

## 2021-03-30 ASSESSMENT — PAIN DESCRIPTION - ONSET: ONSET: GRADUAL

## 2021-03-30 ASSESSMENT — PAIN - FUNCTIONAL ASSESSMENT: PAIN_FUNCTIONAL_ASSESSMENT: ACTIVITIES ARE NOT PREVENTED

## 2021-03-30 ASSESSMENT — PAIN SCALES - GENERAL
PAINLEVEL_OUTOF10: 8
PAINLEVEL_OUTOF10: 9
PAINLEVEL_OUTOF10: 6
PAINLEVEL_OUTOF10: 6
PAINLEVEL_OUTOF10: 3
PAINLEVEL_OUTOF10: 8

## 2021-03-30 ASSESSMENT — PAIN DESCRIPTION - ORIENTATION: ORIENTATION: LOWER

## 2021-03-30 ASSESSMENT — PAIN DESCRIPTION - FREQUENCY: FREQUENCY: INTERMITTENT

## 2021-03-30 ASSESSMENT — PAIN DESCRIPTION - DESCRIPTORS: DESCRIPTORS: SORE

## 2021-03-30 NOTE — FLOWSHEET NOTE
This note will not be viewable in [facility] for the following reason(s). : Unable to separate mother vs.  PHI Pt and father of the baby have minimal interaction with the infant throughout the night. Father of the infant not making eye contact with staff, slurring words, and stumbling slightly. Infant taken to nursery throughout the night, remains in nursery at this time.

## 2021-03-30 NOTE — PLAN OF CARE
Problem: Pain:  Goal: Pain level will decrease  Description: Pain level will decrease  3/30/2021 0106 by Wang Ward RN  Outcome: Met This Shift  3/29/2021 1828 by Krista Agosto Early  Outcome: Met This Shift  Goal: Control of acute pain  Description: Control of acute pain  Outcome: Met This Shift  Goal: Control of chronic pain  Description: Control of chronic pain  Outcome: Met This Shift     Problem: Fluid Volume - Imbalance:  Goal: Absence of postpartum hemorrhage signs and symptoms  Description: Absence of postpartum hemorrhage signs and symptoms  3/30/2021 0106 by Wang Ward RN  Outcome: Met This Shift  3/29/2021 1828 by Lorrain Curling A Early  Outcome: Met This Shift  Goal: Absence of imbalanced fluid volume signs and symptoms  Description: Absence of imbalanced fluid volume signs and symptoms  3/30/2021 0106 by Wang Ward RN  Outcome: Met This Shift  3/29/2021 1828 by Lorphilipin Karissaling A Early  Outcome: Met This Shift     Problem: Infection - Surgical Site:  Goal: Will show no infection signs and symptoms  Description: Will show no infection signs and symptoms  3/30/2021 0106 by Wang Ward RN  Outcome: Met This Shift  3/29/2021 1828 by Lorrain Curling A Early  Outcome: Met This Shift     Problem: Mood - Altered:  Goal: Mood stable  Description: Mood stable  3/30/2021 0106 by Wang Ward RN  Outcome: Met This Shift  3/29/2021 1828 by Lorphilipin Curling A Early  Outcome: Met This Shift     Problem: Nausea/Vomiting:  Goal: Absence of nausea/vomiting  Description: Absence of nausea/vomiting  Outcome: Met This Shift     Problem: Pain - Acute:  Goal: Pain level will decrease  Description: Pain level will decrease  3/30/2021 0106 by Wang Ward RN  Outcome: Met This Shift  3/29/2021 1828 by Lorrain Curling A Early  Outcome: Met This Shift     Problem: Venous Thromboembolism:  Goal: Will show no signs or symptoms of venous thromboembolism  Description: Will show no signs or symptoms of venous thromboembolism  3/30/2021 0106 by Tonny Cavazos Karl Morris RN  Outcome: Met This Shift  3/29/2021 1828 by Gissel Bueno Early  Outcome: Met This Shift  Goal: Absence of signs or symptoms of impaired coagulation  Description: Absence of signs or symptoms of impaired coagulation  Outcome: Met This Shift

## 2021-03-30 NOTE — CARE COORDINATION
This note will not be viewable in MyChart for the following reason(s). [place]: Unable to separate mother vs.  [address]       Discharge Planning     CHERRIE received a call from baby's father, Daniel Enamorado. Alberto requested an update on children services involvement. CHERRIE informed Alberto that CHERRIE recently spoke with [facility] ( [phone]) supervisor, who reported that a case WILL be assigned, but a  was not assigned at that time. Alberto expressed frustration and reported that he would NOT let [facility] ( [phone]) take his daughter. CHERRIE attempted to provide support and encouraged him to look for family members that may be able to provide support at this time. Alberto questioned this SW on why SW \" had to call children services\" and stated that he did not feel that SW should have called children services. SW provided education and support. Daniel Enamorado did report that if tested today, he would \"test dirty\" but did not say what for. Alberto reported that \" children services can test me next week and I'll be clean. And I'll never make a mistake again\". Danielluz Rubyig did report that mother had tested positive for her last child for cocaine at [facility], and \" the hospital had called her back and said they made a mistake\". CHERRIE informed Alberto, that CHERRIE is not a nurse or doctor, and that CHERRIE would discuss this concern with toxicology tomorrow. Alberto was expressed frustrations with CHERRIE, however was not unpleasant. CHERRIE does have concerns that Alberto may give children services a difficult time as expressed plans to \"fight them\" and refused to \" let them have my daughter\"     CHERRIE received a call from [facility] ( [phone]) , Sabina Davis, who reported she was assigned to the case. Joelle plans on presenting to the hospital to drug test both mother and father tomorrow afternoon and is currently looking for an appropriate family to help and/or place baby with.     PLAN Baby can NOT be discharged home until [facility] ( [phone]) provides disposition  SW to continue communication with nursing staff and [facility] ( [phone])     Electronically signed by [facility] on 3/30/2021 at 3:52 PM

## 2021-03-30 NOTE — FLOWSHEET NOTE
Pt independently ambulated out of bed and to the bathroom and was able to void. Pt has no complaints at this time.

## 2021-03-30 NOTE — CARE COORDINATION
This note will not be viewable in MyChart for the following reason(s). : Unable to separate mother vs.  [address]      SW Discharge Planning   SW received consult for \" pt taking suboxone at home, UDS positive for cocaine use\" SW noted mother with positive UDS for cocaine on 3/29, and baby with positive UDS for cocaine on 3/29/21. CHERRIE was also informed that nursing staff has concerns that father of the baby may be high, reporting that last evening he was stumbling and slurring his words. CHERRIE met with Socorro Martinez ( [phone]) mother to baby girl Taina Ashley ( 3/29/21) and introduced self and role. Hector Nails reported that she resides at the address listed in the chart with baby's boyfriend Aleks Tena \" Shortyreida Lynne" Angelia Mendoza and his mother, Ainsley Portillo. Alberto was present in th room, and Hector Nails requested Alberto to be part of the conversation. Hector Nails appeared to have difficulty waking Alberto up. Hector Nails identified [facility] as a support system, however reported that [facility] is bed/chair ridden and on oxygen. Per Marlen Angella has no history of substance abuse. Hector Nails stated she has two other children, however was unclear if she currently has custody of them Alina Wilkins  3/6/14 and Christi Trevizo 17). Hector Nails stated that she is currently unemployed and plans to add baby to her [employer]. Per Hector Nails, prenatal care was with Dr. Leisa Hidalgo and pediatric care will with [facility]. Hector Nails Reported that she has all needed items including a car seat and pack and play. We discussed safe sleep practices. Hector Nails was agreeable to a [facility] referral. Hector Nails  denied any past or current history of children services involvement, legal issues, domestic violence or mental health diagnosis. We discussed awareness of Post Partum Depression and encouraged contact with her OB if any problems arise. CHERRIE dicussed Hector Nails and baby's positive UDS for cocaine on 3/29/21.  Hector Nails denied any and all usage of any substances aside from [facility] and reported that her THC \" must have been laced\". SW did notify Merlin Grace that she had no positive screens for THC. Merlin Grace stated that this must be a mistake, and reported that this also occurred at [facility], where she receives her subutex from. According to Merlin Sanjay, she also has had positive screens for cocaine through [facility], however insisted that she has only smoked THC. SW also addressed Marianne's past substance abuse history. Per Merlin Grace, she was previously addicted to heroin, and is currently 3 years sober from Heroin. Merlin Sanjay also reported that baby's Father, Marleny Montes is currently 4 years sober from heroin, however told me if he was drug tested now he wouldn't be clean, and declined to tell me what he would test positive for. Alberto did not not say anything in regard to his usage at this time. Both parents expressed understanding for the need of a [facility] ( [phone])  referral.     During assessment, both parents appeared to speak slowly and appeared to have difficulty following the conversation. SW noted a slight slur to both parent's speech. Both had difficulty maintaining eye contact, and Alberto nodded off several times during our conversation. SW has concerns that parents are not forthcoming with information, as no THC was noted in mother's system.      [facility] [phone][facility] referral to Janice Coulter in intake     PLAN  Baby can NOT be discharged home until [facility] ( [phone]) provides disposition  SW to continue communication with nursing staff and [facility] ( [phone])     [facility] referral was completed       Electronically signed by EDWIN Eastman on 3/30/2021 at 10:31 AM

## 2021-03-30 NOTE — PROGRESS NOTES
Spoke with Vera Guerrero from [address] [place] [address] regarding verification of patient's subutex. Stated that patient is ordered 16mg daily and takes it once in the morning.   Spoke with patient, she stated that she takes it around 9am.

## 2021-03-30 NOTE — FLOWSHEET NOTE
Pt's araiza catheter was removed after 1300 cc's of clear, yellow output was noted. Pt then dangled and stood at the bedside before ambulating to the bathroom with standby assistance. Caitlyn care and hand hygiene were done before the pt ambulated back to bed with standby assistance. Pt tolerated well. Pt instructed to call before getting up the next time and she verbalized her understanding.

## 2021-03-30 NOTE — PROGRESS NOTES
OB/GYN SERVICE  Attending Post-Op Progress Note      SUBJECTIVE: Patient without complaints    OBJECTIVE      Physical    INTAKE/OUTPUT:    Intake/Output Summary (Last 24 hours) at 3/30/2021 1901  Last data filed at 3/30/2021 0030  Gross per 24 hour   Intake 610 ml   Output 1300 ml   Net -690 ml     TEMPERATURE:  Current - Temp: 97.9 °F (36.6 °C);  Max - Temp  Av °F (36.7 °C)  Min: 97.3 °F (36.3 °C)  Max: 99.1 °F (37.3 °C)  RESPIRATIONS RANGE: Resp  Av.3  Min: 16  Max: 20  PULSE RANGE: Pulse  Av.4  Min: 61  Max: 99  BLOOD PRESSURE RANGE:  Systolic (15SCK), KBM:916 , Min:101 , PKH:340   ; Diastolic (95WTN), UIO:70, Min:50, Max:69    CONSTITUTIONAL:  awake, alert, cooperative, no apparent distress, and appears stated age  LUNGS:  No increased work of breathing, good air exchange, clear to auscultation bilaterally, no crackles or wheezing  ABDOMEN: Incision is dry and clear without drainage and normal bowel sounds  GENITAL/URINARY:  External Genitalia:  General appearance; normal, mild lochia rubra is present  Extremities: No calf tenderness no pedal edema  Data  CBC:   Lab Results   Component Value Date    WBC 10.7 2021    RBC 3.52 2021    HGB 11.1 2021    HCT 33.4 2021    MCV 94.9 2021    MCH 31.5 2021    MCHC 33.2 2021    RDW 14.5 2021     2021    MPV 11.3 2021       Current Inpatient Medications    Current Facility-Administered Medications: [START ON 3/31/2021] buprenorphine (SUBUTEX) SL tablet 16 mg, 16 mg, Sublingual, Daily  lactated ringers infusion, , Intravenous, Continuous  oxytocin (PITOCIN) 10 unit bolus from the bag, 10 Units, Intravenous, PRN **AND** [COMPLETED] oxytocin (PITOCIN) 30 units in 500 mL infusion, 87.3 samantha-units/min, Intravenous, PRN  naloxone (NARCAN) injection 0.4 mg, 0.4 mg, Intravenous, PRN  lactated ringers infusion, , Intravenous, Continuous  sodium chloride flush 0.9 % injection 10 mL, 10 mL, Intravenous, 2 times per day  acetaminophen (TYLENOL) tablet 650 mg, 650 mg, Oral, Q4H PRN  rho(D) immune globulin (HYPERRHO S/D) injection 300 mcg, 300 mcg, Intramuscular, Once  docusate sodium (COLACE) capsule 100 mg, 100 mg, Oral, BID  lansinoh lanolin ointment, , Topical, Q1H PRN  Tetanus-Diphth-Acell Pertussis (BOOSTRIX) injection 0.5 mL, 0.5 mL, Intramuscular, Prior to discharge  ibuprofen (ADVIL;MOTRIN) tablet 800 mg, 800 mg, Oral, Q8H  ketorolac (TORADOL) injection 30 mg, 30 mg, Intravenous, Q6H  diphenhydrAMINE (BENADRYL) injection 25 mg, 25 mg, Intravenous, Q6H PRN  prenatal vitamin 27-1 MG tablet 1 tablet, 1 tablet, Oral, Daily  carboprost (HEMABATE) injection 250 mcg, 250 mcg, Intramuscular, PRN  methylergonovine (METHERGINE) injection 200 mcg, 200 mcg, Intramuscular, PRN  miSOPROStol (CYTOTEC) tablet 800 mcg, 800 mcg, Rectal, PRN  ferrous sulfate (IRON 325) tablet 325 mg, 325 mg, Oral, BID WC    ASSESSMENT AND PLAN    31 y.o. female status post repeat  post op day #  1 stable  We will encourage ambulation and deep breathing    Geovany Mcpherson  7:01 PM

## 2021-03-31 VITALS
RESPIRATION RATE: 16 BRPM | TEMPERATURE: 98.2 F | SYSTOLIC BLOOD PRESSURE: 123 MMHG | HEART RATE: 78 BPM | BODY MASS INDEX: 23.79 KG/M2 | WEIGHT: 126 LBS | OXYGEN SATURATION: 98 % | HEIGHT: 61 IN | DIASTOLIC BLOOD PRESSURE: 78 MMHG

## 2021-03-31 PROCEDURE — 6370000000 HC RX 637 (ALT 250 FOR IP): Performed by: OBSTETRICS & GYNECOLOGY

## 2021-03-31 PROCEDURE — 6360000002 HC RX W HCPCS: Performed by: OBSTETRICS & GYNECOLOGY

## 2021-03-31 RX ORDER — NICOTINE 21 MG/24HR
1 PATCH, TRANSDERMAL 24 HOURS TRANSDERMAL DAILY
Qty: 30 PATCH | Refills: 3 | Status: SHIPPED | OUTPATIENT
Start: 2021-03-31

## 2021-03-31 RX ORDER — IBUPROFEN 800 MG/1
800 TABLET ORAL EVERY 8 HOURS
Qty: 90 TABLET | Refills: 1 | Status: SHIPPED | OUTPATIENT
Start: 2021-03-31

## 2021-03-31 RX ORDER — MODIFIED LANOLIN
1 OINTMENT (GRAM) TOPICAL
Qty: 1 TUBE | Refills: 3 | Status: SHIPPED | OUTPATIENT
Start: 2021-03-31

## 2021-03-31 RX ADMIN — METFORMIN HYDROCHLORIDE 1 TABLET: 500 TABLET, EXTENDED RELEASE ORAL at 08:53

## 2021-03-31 RX ADMIN — IBUPROFEN 800 MG: 800 TABLET, FILM COATED ORAL at 02:21

## 2021-03-31 RX ADMIN — BUPRENORPHINE HCL 16 MG: 8 TABLET SUBLINGUAL at 08:53

## 2021-03-31 RX ADMIN — DOCUSATE SODIUM 100 MG: 100 CAPSULE, LIQUID FILLED ORAL at 08:53

## 2021-03-31 RX ADMIN — ACETAMINOPHEN 650 MG: 325 TABLET ORAL at 10:01

## 2021-03-31 RX ADMIN — ACETAMINOPHEN 650 MG: 325 TABLET ORAL at 00:47

## 2021-03-31 ASSESSMENT — PAIN SCALES - GENERAL
PAINLEVEL_OUTOF10: 0
PAINLEVEL_OUTOF10: 5
PAINLEVEL_OUTOF10: 1

## 2021-03-31 NOTE — PROGRESS NOTES
CLINICAL PHARMACY NOTE: MEDS TO [address] [place] [address] Drive Select Patient?: No  Total # of Prescriptions Filled: 2   The following medications were delivered to the patient:  · Ibuprofen 800 mg  · Nicotine 21 mg     Total # of Interventions Completed: 2  Time Spent (min): 45    Additional Documentation:

## 2021-03-31 NOTE — CARE COORDINATION
This note will not be viewable in [employer] for the following reason(s). [place]: Unable to separate mother vs.   [facility] ( [phone]) , Rene Sharma, presented to the hospital to meet with family. Per Rene Sharma, both parents admitted to recent cocaine usage and father, Nasrin Brink, reported that he would agree to a urine test, however refused a mouth drug swab. Rene Sharma stated that she has spoke to them and is trying to complete an out of home safety plan for baby to stay with another relative, however, at this time the family has not been able to provide a safety plan provider. Per Rene Sharma, if they are unable to do so, baby will be taken into custody. Rene Sharma also expressed concern that father was high during her assessment.        SW completed home healthcare referral to Steph Baeza at [facility] can NOT be discharged home until [facility] ( [phone]) provides disposition  SW to continue communication with nursing staff and [facility] ( [phone])     Electronically signed by EDWIN Mehta on 3/31/2021 at 1:55 PM

## 2021-03-31 NOTE — FLOWSHEET NOTE
Patient discharged to home in stable condition via ambulation accompanied by father of infant. Patient states that she needs to go home to be with and take care of her two older sons so that her mother can go to work. Patient states that she will return tomorrow to visit infant.

## 2021-03-31 NOTE — PROGRESS NOTES
mg, 650 mg, Oral, Q4H PRN  rho(D) immune globulin (HYPERRHO S/D) injection 300 mcg, 300 mcg, Intramuscular, Once  docusate sodium (COLACE) capsule 100 mg, 100 mg, Oral, BID  lansinoh lanolin ointment, , Topical, Q1H PRN  Tetanus-Diphth-Acell Pertussis (BOOSTRIX) injection 0.5 mL, 0.5 mL, Intramuscular, Prior to discharge  ibuprofen (ADVIL;MOTRIN) tablet 800 mg, 800 mg, Oral, Q8H  ketorolac (TORADOL) injection 30 mg, 30 mg, Intravenous, Q6H  diphenhydrAMINE (BENADRYL) injection 25 mg, 25 mg, Intravenous, Q6H PRN  prenatal vitamin 27-1 MG tablet 1 tablet, 1 tablet, Oral, Daily  carboprost (HEMABATE) injection 250 mcg, 250 mcg, Intramuscular, PRN  methylergonovine (METHERGINE) injection 200 mcg, 200 mcg, Intramuscular, PRN  miSOPROStol (CYTOTEC) tablet 800 mcg, 800 mcg, Rectal, PRN  ferrous sulfate (IRON 325) tablet 325 mg, 325 mg, Oral, BID WC    ASSESSMENT AND PLAN    31 y.o. female status post repeat  post op day #  2 doing well  Patient is for discharge to home today    Kell Mueller  8:19 AM

## 2021-03-31 NOTE — FLOWSHEET NOTE
Patient signed discharge instructions and was given a copy for her records. [address] [facility]) caseworkers present in room 320 and interviewing patient.

## 2021-03-31 NOTE — PLAN OF CARE
Problem: Pain:  Goal: Pain level will decrease  Outcome: Met This Shift  Goal: Control of acute pain  Outcome: Met This Shift  Goal: Control of chronic pain  Outcome: Met This Shift     Problem: Discharge Planning:  Goal: Discharged to appropriate level of care  Outcome: Met This Shift     Problem: Fluid Volume - Imbalance:  Goal: Absence of postpartum hemorrhage signs and symptoms  Outcome: Met This Shift  Goal: Absence of imbalanced fluid volume signs and symptoms  Outcome: Met This Shift     Problem: Infection - Surgical Site:  Goal: Will show no infection signs and symptoms  Outcome: Met This Shift     Problem: Mood - Altered:  Goal: Mood stable  Outcome: Met This Shift     Problem: Nausea/Vomiting:  Goal: Absence of nausea/vomiting  Outcome: Met This Shift     Problem: Pain - Acute:  Goal: Pain level will decrease  Outcome: Met This Shift     Problem: Urinary Retention:  Goal: Urinary elimination within specified parameters  Outcome: Met This Shift     Problem: Venous Thromboembolism:  Goal: Will show no signs or symptoms of venous thromboembolism  Outcome: Met This Shift  Goal: Absence of signs or symptoms of impaired coagulation  Outcome: Met This Shift

## 2021-04-02 NOTE — CARE COORDINATION
This note will not be viewable in [employer] for the following reason(s). [place]: Unable to separate mother vs.  [address] [place] [address]       Discharge Planning      received a call from [facility] ( [phone])  , Tarun Vance. Who reported that baby is to be discharged home to maternal grandmother, Rajni Beverly ( [phone] : [place] [address], [facility])  BOTH GRANDMOTHER AND MOTHER WILL NEED TO BE NOTIFIED OF WHAT TIME DISCHARGE WILL OCCUR. Per [employer] and Claudia Marin can be present at discharge, however Kilo Burton has asked for security to be present AND WALK GRANDMOTHER TO CAR due to safety concerns. Per Taina, IF any concerns arise, or if parents do not allow grandmother to take baby, children services  will need to be called ( [phone]) and they will take baby into custody. PLAN    1. Baby to be discharged home to maternal grandmother, Rajni Beverly ( [phone])  2. Copy of Natan's drivers license is to be placed in baby's chart. 3. Security MUST be present AND WALK NATAN TO HER CAR   4. Biological parents are allowed to present to the hospital however CAN NOT take baby home.  If they refuse to let grandma take baby, [facility] is to be called ( [phone])    Electronically signed by EDWIN Canas on 2021 at 2:02 PM

## 2021-04-03 LAB — COCAINE, CONFIRM, URINE: >1000 NG/ML

## 2021-04-12 NOTE — CARE COORDINATION
This note will not be viewable in MyChart for the following reason(s). : Unable to separate mother vs.  [facility]  SW Discharge Planning     SW noted baby's cord stat to be positive for Buprenorphine, Norbuprenorphine, Naloxone, cocaine, Benzoylecgonine, P-GO-Xvkhgvnvbnfgpao and Gabapentin.  SW called [facility] ( [phone]) and reported information to , Sil Xiong    Electronically signed by EDWIN Osborne on 2021 at 2:38 PM

## 2021-04-22 NOTE — DISCHARGE SUMMARY
Obstetrical Discharge Form    Primary OB Clinician: KANIKA Tavera,  FACOG  Postpartum/postoperative day #3    Gestational Age at time of delivery: 43 weeks and 1 day    Date of Delivery: 3/29/2021; Time of Delivery: 1105    Delivery Type: repeat  section, low transverse incision    Baby: Liveborn female,     Intrapartum complications: None    Laceration: none    Episiotomy: none,    Feeding method: both breast and bottle - Similac with iron    Rh Immune globulin given: no    Rubella vaccine given: no    Discharge Date: 3/31/2021; Discharge Time: 0800    Early Discharge:  YES-Maternal- home visit declined by patient. Condition at time of discharge home is good as well as disposition    Plan:     Follow-up appointment with Dr. Vida Pascual in 2 weeks.

## 2023-09-05 ENCOUNTER — HOSPITAL ENCOUNTER (OUTPATIENT)
Dept: DATA CONVERSION | Facility: HOSPITAL | Age: 33
Discharge: HOME | End: 2023-09-05

## 2023-09-05 DIAGNOSIS — R10.2 PELVIC AND PERINEAL PAIN: ICD-10-CM

## 2023-09-05 DIAGNOSIS — N91.2 AMENORRHEA, UNSPECIFIED: ICD-10-CM

## 2024-12-03 ENCOUNTER — HOSPITAL ENCOUNTER (EMERGENCY)
Age: 34
Discharge: HOME | End: 2024-12-03
Attending: EMERGENCY MEDICINE
Payer: COMMERCIAL

## 2024-12-03 VITALS
TEMPERATURE: 98.2 F | HEART RATE: 74 BPM | DIASTOLIC BLOOD PRESSURE: 61 MMHG | OXYGEN SATURATION: 98 % | RESPIRATION RATE: 16 BRPM | SYSTOLIC BLOOD PRESSURE: 108 MMHG

## 2024-12-03 DIAGNOSIS — K04.7 DENTAL INFECTION: Primary | ICD-10-CM

## 2024-12-03 PROCEDURE — 99283 EMERGENCY DEPT VISIT LOW MDM: CPT

## 2024-12-03 RX ORDER — IBUPROFEN 600 MG/1
600 TABLET, FILM COATED ORAL EVERY 8 HOURS PRN
Qty: 30 TABLET | Refills: 0 | Status: SHIPPED | OUTPATIENT
Start: 2024-12-03 | End: 2024-12-13

## 2024-12-03 RX ORDER — CLINDAMYCIN HYDROCHLORIDE 150 MG/1
150 CAPSULE ORAL 4 TIMES DAILY
Qty: 40 CAPSULE | Refills: 0 | Status: SHIPPED | OUTPATIENT
Start: 2024-12-03 | End: 2024-12-13

## 2024-12-03 ASSESSMENT — ENCOUNTER SYMPTOMS
EYE DISCHARGE: 0
COUGH: 0
SINUS PRESSURE: 0
EYE PAIN: 0
VOMITING: 0
BACK PAIN: 0
SORE THROAT: 0
WHEEZING: 0
EYE REDNESS: 0
ABDOMINAL DISTENTION: 0
SHORTNESS OF BREATH: 0
NAUSEA: 0
DIARRHEA: 0

## 2024-12-03 ASSESSMENT — PAIN DESCRIPTION - ORIENTATION: ORIENTATION: LEFT;LOWER

## 2024-12-03 ASSESSMENT — PAIN DESCRIPTION - PAIN TYPE: TYPE: ACUTE PAIN

## 2024-12-03 ASSESSMENT — PAIN DESCRIPTION - DESCRIPTORS: DESCRIPTORS: DISCOMFORT

## 2024-12-03 ASSESSMENT — PAIN - FUNCTIONAL ASSESSMENT
PAIN_FUNCTIONAL_ASSESSMENT: PREVENTS OR INTERFERES SOME ACTIVE ACTIVITIES AND ADLS
PAIN_FUNCTIONAL_ASSESSMENT: 0-10

## 2024-12-03 ASSESSMENT — PAIN DESCRIPTION - LOCATION: LOCATION: MOUTH

## 2024-12-03 ASSESSMENT — PAIN DESCRIPTION - FREQUENCY: FREQUENCY: CONTINUOUS

## 2024-12-03 ASSESSMENT — PAIN SCALES - GENERAL: PAINLEVEL_OUTOF10: 10

## 2024-12-04 NOTE — ED PROVIDER NOTES
The history is provided by the patient.   Dental Pain  Location:  Lower  Quality:  Aching  Severity:  Moderate  Onset quality:  Gradual  Duration:  2 days  Chronicity:  Recurrent  Context: abscess and dental caries    Associated symptoms: no fever and no headaches         Review of Systems   Constitutional:  Negative for chills and fever.   HENT:  Negative for ear pain, sinus pressure and sore throat.    Eyes:  Negative for pain, discharge and redness.   Respiratory:  Negative for cough, shortness of breath and wheezing.    Cardiovascular:  Negative for chest pain.   Gastrointestinal:  Negative for abdominal distention, diarrhea, nausea and vomiting.   Genitourinary:  Negative for dysuria and frequency.   Musculoskeletal:  Negative for arthralgias and back pain.   Skin:  Negative for rash and wound.   Neurological:  Negative for weakness and headaches.   Hematological:  Negative for adenopathy.   All other systems reviewed and are negative.       Physical Exam  Vitals and nursing note reviewed.   Constitutional:       Appearance: She is well-developed.   HENT:      Head: Normocephalic and atraumatic.      Mouth/Throat:      Dentition: Gingival swelling and dental caries present.     Eyes:      Pupils: Pupils are equal, round, and reactive to light.   Cardiovascular:      Rate and Rhythm: Normal rate and regular rhythm.      Heart sounds: Normal heart sounds. No murmur heard.  Pulmonary:      Effort: Pulmonary effort is normal. No respiratory distress.      Breath sounds: Normal breath sounds. No wheezing or rales.   Abdominal:      General: Bowel sounds are normal.      Palpations: Abdomen is soft.      Tenderness: There is no abdominal tenderness. There is no guarding or rebound.   Musculoskeletal:      Cervical back: Normal range of motion and neck supple.   Skin:     General: Skin is warm and dry.   Neurological:      Mental Status: She is alert and oriented to person, place, and time.      Cranial Nerves: No

## 2025-08-23 ENCOUNTER — HOSPITAL ENCOUNTER (EMERGENCY)
Age: 35
Discharge: HOME | End: 2025-08-23
Attending: FAMILY MEDICINE
Payer: COMMERCIAL

## 2025-08-23 VITALS
RESPIRATION RATE: 16 BRPM | HEART RATE: 74 BPM | SYSTOLIC BLOOD PRESSURE: 106 MMHG | DIASTOLIC BLOOD PRESSURE: 60 MMHG | TEMPERATURE: 98.6 F | OXYGEN SATURATION: 100 %

## 2025-08-23 DIAGNOSIS — K02.9 PAIN DUE TO DENTAL CARIES: Primary | ICD-10-CM

## 2025-08-23 PROCEDURE — 99283 EMERGENCY DEPT VISIT LOW MDM: CPT

## 2025-08-23 RX ORDER — IBUPROFEN 800 MG/1
800 TABLET, FILM COATED ORAL EVERY 8 HOURS PRN
Qty: 20 TABLET | Refills: 0 | Status: SHIPPED | OUTPATIENT
Start: 2025-08-23

## 2025-08-23 RX ORDER — CLINDAMYCIN HYDROCHLORIDE 300 MG/1
CAPSULE ORAL
Qty: 40 CAPSULE | Refills: 0 | Status: SHIPPED | OUTPATIENT
Start: 2025-08-23

## 2025-08-23 RX ORDER — LIDOCAINE HYDROCHLORIDE 20 MG/ML
SOLUTION OROPHARYNGEAL
Qty: 100 ML | Refills: 0 | Status: SHIPPED | OUTPATIENT
Start: 2025-08-23

## 2025-08-23 ASSESSMENT — PAIN SCALES - GENERAL: PAINLEVEL_OUTOF10: 0

## 2025-08-23 ASSESSMENT — PAIN - FUNCTIONAL ASSESSMENT: PAIN_FUNCTIONAL_ASSESSMENT: 0-10

## (undated) DEVICE — CONTAINER,SPEC,PNEUM TUBE,3OZ,STRL PATH: Brand: MEDLINE

## (undated) DEVICE — 3000CC GUARDIAN II: Brand: GUARDIAN

## (undated) DEVICE — SUTURE VCRL + SZ 3-0 L36IN ABSRB UD L36MM CT-1 1/2 CIR VCP944H

## (undated) DEVICE — APPLICATOR PREP 26ML 0.7% IOD POVACRYLEX 74% ISO ALC ST

## (undated) DEVICE — SUTURE ABSORBABLE BRAIDED 0 CT 36 IN DA UD VICRYL VCP958H

## (undated) DEVICE — STAPLER SKIN SQ 30 ABSRB STPL DISP INSORB

## (undated) DEVICE — PEN: MARKING STD 100/CS: Brand: MEDICAL ACTION INDUSTRIES

## (undated) DEVICE — GLOVE SURG SZ 75 L12IN FNGR THK83MIL CRM POLYISOPRENE

## (undated) DEVICE — SKIN AFFIX SURG ADHESIVE 72/CS 0.55ML: Brand: MEDLINE

## (undated) DEVICE — GOWN,SIRUS,FABRNF,L,20/CS: Brand: MEDLINE

## (undated) DEVICE — COVER,LIGHT HANDLE,FLX,2/PK: Brand: MEDLINE INDUSTRIES, INC.

## (undated) DEVICE — PENCIL ES L3M BTTN SWCH HOLSTER W/ BLDE ELECTRD EDGE

## (undated) DEVICE — TOWEL,OR,DSP,ST,BLUE,STD,6/PK,12PK/CS: Brand: MEDLINE

## (undated) DEVICE — COUNTER NDL 30 COUNT DBL MAG

## (undated) DEVICE — CESAREAN BIRTH PACK II: Brand: MEDLINE INDUSTRIES, INC.

## (undated) DEVICE — SHEET,DRAPE,53X77,STERILE: Brand: MEDLINE

## (undated) DEVICE — GLOVE SURG SZ 65 L12IN FNGR THK83MIL CRM POLYISOPRENE

## (undated) DEVICE — TUBING, SUCTION, 3/16" X 12', STRAIGHT: Brand: MEDLINE

## (undated) DEVICE — MEDI-VAC YANKAUER SUCTION HANDLE W/BULBOUS TIP: Brand: CARDINAL HEALTH

## (undated) DEVICE — CATHETERIZATION KIT FOL16 FR 2000 CC DRAINAGE BG LUBRICATH

## (undated) DEVICE — ELECTRODE PT RET AD L9FT HI MOIST COND ADH HYDRGEL CORDED

## (undated) DEVICE — TUBE BLD COLLECT ST 1 SIL COAT 7ML 10ML

## (undated) DEVICE — GOWN,SIRUS,POLYRNF,BRTHSLV,XLN/XXL,18/CS: Brand: MEDLINE

## (undated) DEVICE — AGENT HEMSTAT 3GM PURIFIED PLNT STARCH PWD ABSRB ARISTA AH

## (undated) DEVICE — BLADE SURG NO20 S STL STR DISP GLASSVAN

## (undated) DEVICE — SUTURE VCRL + SZ 0 L36IN ABSRB VLT L36MM CT-1 1/2 CIR VCP346H

## (undated) DEVICE — CONTAINER SPEC 64OZ POLYPR PATH SNAP LOK CAP W/ LID

## (undated) DEVICE — SPONGE LAP W18XL18IN WHT COT 4 PLY FLD STRUNG RADPQ DISP ST